# Patient Record
Sex: FEMALE | Race: WHITE | URBAN - METROPOLITAN AREA
[De-identification: names, ages, dates, MRNs, and addresses within clinical notes are randomized per-mention and may not be internally consistent; named-entity substitution may affect disease eponyms.]

---

## 2023-03-07 LAB
C. TRACHOMATIS, EXTERNAL RESULT: NEGATIVE
N. GONORRHOEAE, EXTERNAL RESULT: NEGATIVE

## 2023-03-20 LAB
ABO, EXTERNAL RESULT: NORMAL
HEP B, EXTERNAL RESULT: NEGATIVE
HIV, EXTERNAL RESULT: NEGATIVE
RH FACTOR, EXTERNAL RESULT: POSITIVE
RPR, EXTERNAL RESULT: NON REACTIVE
RUBELLA TITER, EXTERNAL RESULT: NORMAL

## 2023-10-05 LAB — GBS, EXTERNAL RESULT: POSITIVE

## 2023-11-01 ENCOUNTER — HOSPITAL ENCOUNTER (INPATIENT)
Age: 28
LOS: 4 days | Discharge: HOME | End: 2023-11-05
Attending: OBSTETRICS & GYNECOLOGY | Admitting: OBSTETRICS & GYNECOLOGY
Payer: COMMERCIAL

## 2023-11-01 PROCEDURE — 1220000000 HC SEMI PRIVATE OB R&B

## 2023-11-02 ENCOUNTER — ANESTHESIA (OUTPATIENT)
Dept: LABOR AND DELIVERY | Age: 28
End: 2023-11-02
Payer: COMMERCIAL

## 2023-11-02 ENCOUNTER — ANESTHESIA EVENT (OUTPATIENT)
Dept: LABOR AND DELIVERY | Age: 28
End: 2023-11-02
Payer: COMMERCIAL

## 2023-11-02 PROBLEM — Z37.9 NORMAL LABOR: Status: ACTIVE | Noted: 2023-11-02

## 2023-11-02 LAB
ABO + RH BLD: NORMAL
AMPHETAMINES UR QL SCN>1000 NG/ML: NORMAL
BARBITURATES UR QL SCN>200 NG/ML: NORMAL
BASOPHILS # BLD: 0 K/UL (ref 0–0.2)
BASOPHILS NFR BLD: 0.3 %
BENZODIAZ UR QL SCN>200 NG/ML: NORMAL
BLD GP AB SCN SERPL QL: NORMAL
BUPRENORPHINE+NOR UR QL SCN: NORMAL
CANNABINOIDS UR QL SCN>50 NG/ML: NORMAL
COCAINE UR QL SCN: NORMAL
DEPRECATED RDW RBC AUTO: 14.1 % (ref 12.4–15.4)
DRUG SCREEN COMMENT UR-IMP: NORMAL
EOSINOPHIL # BLD: 0 K/UL (ref 0–0.6)
EOSINOPHIL NFR BLD: 0.3 %
FENTANYL SCREEN, URINE: NORMAL
HCT VFR BLD AUTO: 37.5 % (ref 36–48)
HGB BLD-MCNC: 12.7 G/DL (ref 12–16)
LYMPHOCYTES # BLD: 0.9 K/UL (ref 1–5.1)
LYMPHOCYTES NFR BLD: 7.8 %
MCH RBC QN AUTO: 30 PG (ref 26–34)
MCHC RBC AUTO-ENTMCNC: 34 G/DL (ref 31–36)
MCV RBC AUTO: 88.4 FL (ref 80–100)
METHADONE UR QL SCN>300 NG/ML: NORMAL
MONOCYTES # BLD: 0.6 K/UL (ref 0–1.3)
MONOCYTES NFR BLD: 5.1 %
NEUTROPHILS # BLD: 10.3 K/UL (ref 1.7–7.7)
NEUTROPHILS NFR BLD: 86.5 %
OPIATES UR QL SCN>300 NG/ML: NORMAL
OXYCODONE UR QL SCN: NORMAL
PCP UR QL SCN>25 NG/ML: NORMAL
PH UR STRIP: 6 [PH]
PLATELET # BLD AUTO: 215 K/UL (ref 135–450)
PMV BLD AUTO: 8 FL (ref 5–10.5)
RBC # BLD AUTO: 4.24 M/UL (ref 4–5.2)
REAGIN+T PALLIDUM IGG+IGM SERPL-IMP: NORMAL
WBC # BLD AUTO: 11.9 K/UL (ref 4–11)

## 2023-11-02 PROCEDURE — 6360000002 HC RX W HCPCS: Performed by: ADVANCED PRACTICE MIDWIFE

## 2023-11-02 PROCEDURE — 6360000002 HC RX W HCPCS: Performed by: NURSE ANESTHETIST, CERTIFIED REGISTERED

## 2023-11-02 PROCEDURE — 6370000000 HC RX 637 (ALT 250 FOR IP)

## 2023-11-02 PROCEDURE — 86850 RBC ANTIBODY SCREEN: CPT

## 2023-11-02 PROCEDURE — 3700000025 EPIDURAL BLOCK: Performed by: ANESTHESIOLOGY

## 2023-11-02 PROCEDURE — 2500000003 HC RX 250 WO HCPCS: Performed by: NURSE ANESTHETIST, CERTIFIED REGISTERED

## 2023-11-02 PROCEDURE — 86901 BLOOD TYPING SEROLOGIC RH(D): CPT

## 2023-11-02 PROCEDURE — 86780 TREPONEMA PALLIDUM: CPT

## 2023-11-02 PROCEDURE — 85025 COMPLETE CBC W/AUTO DIFF WBC: CPT

## 2023-11-02 PROCEDURE — 2580000003 HC RX 258: Performed by: ADVANCED PRACTICE MIDWIFE

## 2023-11-02 PROCEDURE — 80307 DRUG TEST PRSMV CHEM ANLYZR: CPT

## 2023-11-02 PROCEDURE — 1220000000 HC SEMI PRIVATE OB R&B

## 2023-11-02 PROCEDURE — 2580000003 HC RX 258: Performed by: NURSE ANESTHETIST, CERTIFIED REGISTERED

## 2023-11-02 PROCEDURE — 86900 BLOOD TYPING SEROLOGIC ABO: CPT

## 2023-11-02 RX ORDER — CEFAZOLIN SODIUM IN 0.9 % NACL 2 G/100 ML
2000 PLASTIC BAG, INJECTION (ML) INTRAVENOUS ONCE
Status: COMPLETED | OUTPATIENT
Start: 2023-11-02 | End: 2023-11-02

## 2023-11-02 RX ORDER — SODIUM CHLORIDE 0.9 % (FLUSH) 0.9 %
5-40 SYRINGE (ML) INJECTION PRN
Status: DISCONTINUED | OUTPATIENT
Start: 2023-11-02 | End: 2023-11-03

## 2023-11-02 RX ORDER — ACETAMINOPHEN 500 MG
TABLET ORAL
Status: COMPLETED
Start: 2023-11-02 | End: 2023-11-02

## 2023-11-02 RX ORDER — BUPIVACAINE HYDROCHLORIDE 5 MG/ML
INJECTION, SOLUTION EPIDURAL; INTRACAUDAL PRN
Status: DISCONTINUED | OUTPATIENT
Start: 2023-11-02 | End: 2023-11-02

## 2023-11-02 RX ORDER — MISOPROSTOL 100 UG/1
800 TABLET ORAL PRN
Status: DISCONTINUED | OUTPATIENT
Start: 2023-11-02 | End: 2023-11-03

## 2023-11-02 RX ORDER — DIPHENHYDRAMINE HYDROCHLORIDE 50 MG/ML
25 INJECTION INTRAMUSCULAR; INTRAVENOUS EVERY 4 HOURS PRN
Status: DISCONTINUED | OUTPATIENT
Start: 2023-11-02 | End: 2023-11-03

## 2023-11-02 RX ORDER — NALBUPHINE HYDROCHLORIDE 10 MG/ML
10 INJECTION, SOLUTION INTRAMUSCULAR; INTRAVENOUS; SUBCUTANEOUS
Status: DISCONTINUED | OUTPATIENT
Start: 2023-11-02 | End: 2023-11-03

## 2023-11-02 RX ORDER — BUPIVACAINE HYDROCHLORIDE 2.5 MG/ML
INJECTION, SOLUTION EPIDURAL; INFILTRATION; INTRACAUDAL PRN
Status: DISCONTINUED | OUTPATIENT
Start: 2023-11-02 | End: 2023-11-03 | Stop reason: SDUPTHER

## 2023-11-02 RX ORDER — SODIUM CHLORIDE, SODIUM LACTATE, POTASSIUM CHLORIDE, AND CALCIUM CHLORIDE .6; .31; .03; .02 G/100ML; G/100ML; G/100ML; G/100ML
1000 INJECTION, SOLUTION INTRAVENOUS PRN
Status: DISCONTINUED | OUTPATIENT
Start: 2023-11-02 | End: 2023-11-03

## 2023-11-02 RX ORDER — ACETAMINOPHEN 500 MG
1000 TABLET ORAL ONCE
Status: COMPLETED | OUTPATIENT
Start: 2023-11-03 | End: 2023-11-02

## 2023-11-02 RX ORDER — SODIUM CHLORIDE 0.9 % (FLUSH) 0.9 %
5-40 SYRINGE (ML) INJECTION EVERY 12 HOURS SCHEDULED
Status: DISCONTINUED | OUTPATIENT
Start: 2023-11-02 | End: 2023-11-03

## 2023-11-02 RX ORDER — LIDOCAINE HYDROCHLORIDE 20 MG/ML
INJECTION, SOLUTION EPIDURAL; INFILTRATION; INTRACAUDAL; PERINEURAL PRN
Status: DISCONTINUED | OUTPATIENT
Start: 2023-11-02 | End: 2023-11-03 | Stop reason: SDUPTHER

## 2023-11-02 RX ORDER — DOCUSATE SODIUM 100 MG/1
100 CAPSULE, LIQUID FILLED ORAL 2 TIMES DAILY
Status: DISCONTINUED | OUTPATIENT
Start: 2023-11-02 | End: 2023-11-03

## 2023-11-02 RX ORDER — ACETAMINOPHEN 325 MG/1
650 TABLET ORAL EVERY 4 HOURS PRN
Status: DISCONTINUED | OUTPATIENT
Start: 2023-11-02 | End: 2023-11-03

## 2023-11-02 RX ORDER — SODIUM CHLORIDE, SODIUM LACTATE, POTASSIUM CHLORIDE, CALCIUM CHLORIDE 600; 310; 30; 20 MG/100ML; MG/100ML; MG/100ML; MG/100ML
INJECTION, SOLUTION INTRAVENOUS CONTINUOUS
Status: DISCONTINUED | OUTPATIENT
Start: 2023-11-02 | End: 2023-11-03

## 2023-11-02 RX ORDER — METHYLERGONOVINE MALEATE 0.2 MG/ML
200 INJECTION INTRAVENOUS PRN
Status: DISCONTINUED | OUTPATIENT
Start: 2023-11-02 | End: 2023-11-03

## 2023-11-02 RX ORDER — ONDANSETRON 2 MG/ML
4 INJECTION INTRAMUSCULAR; INTRAVENOUS EVERY 6 HOURS PRN
Status: DISCONTINUED | OUTPATIENT
Start: 2023-11-02 | End: 2023-11-03

## 2023-11-02 RX ORDER — BUPIVACAINE HYDROCHLORIDE 5 MG/ML
INJECTION, SOLUTION EPIDURAL; INTRACAUDAL PRN
Status: DISCONTINUED | OUTPATIENT
Start: 2023-11-02 | End: 2023-11-03 | Stop reason: SDUPTHER

## 2023-11-02 RX ORDER — SODIUM CHLORIDE, SODIUM LACTATE, POTASSIUM CHLORIDE, AND CALCIUM CHLORIDE .6; .31; .03; .02 G/100ML; G/100ML; G/100ML; G/100ML
500 INJECTION, SOLUTION INTRAVENOUS PRN
Status: DISCONTINUED | OUTPATIENT
Start: 2023-11-02 | End: 2023-11-03

## 2023-11-02 RX ORDER — SODIUM CHLORIDE 9 MG/ML
25 INJECTION, SOLUTION INTRAVENOUS PRN
Status: DISCONTINUED | OUTPATIENT
Start: 2023-11-02 | End: 2023-11-03

## 2023-11-02 RX ORDER — CARBOPROST TROMETHAMINE 250 UG/ML
250 INJECTION, SOLUTION INTRAMUSCULAR PRN
Status: DISCONTINUED | OUTPATIENT
Start: 2023-11-02 | End: 2023-11-03

## 2023-11-02 RX ADMIN — BUPIVACAINE HYDROCHLORIDE 3 ML: 5 INJECTION, SOLUTION EPIDURAL; INTRACAUDAL; PERINEURAL at 18:44

## 2023-11-02 RX ADMIN — SODIUM CHLORIDE, POTASSIUM CHLORIDE, SODIUM LACTATE AND CALCIUM CHLORIDE: 600; 310; 30; 20 INJECTION, SOLUTION INTRAVENOUS at 06:26

## 2023-11-02 RX ADMIN — CEFAZOLIN 1000 MG: 1 INJECTION, POWDER, FOR SOLUTION INTRAMUSCULAR; INTRAVENOUS at 19:14

## 2023-11-02 RX ADMIN — Medication 1 MILLI-UNITS/MIN: at 16:30

## 2023-11-02 RX ADMIN — CEFAZOLIN 1000 MG: 1 INJECTION, POWDER, FOR SOLUTION INTRAMUSCULAR; INTRAVENOUS at 10:11

## 2023-11-02 RX ADMIN — Medication 1000 MG: at 23:57

## 2023-11-02 RX ADMIN — DEXMEDETOMIDINE HYDROCHLORIDE 20 MCG: 100 INJECTION, SOLUTION INTRAVENOUS at 23:50

## 2023-11-02 RX ADMIN — SODIUM CHLORIDE, POTASSIUM CHLORIDE, SODIUM LACTATE AND CALCIUM CHLORIDE: 600; 310; 30; 20 INJECTION, SOLUTION INTRAVENOUS at 14:47

## 2023-11-02 RX ADMIN — DEXMEDETOMIDINE HYDROCHLORIDE 20 MCG: 100 INJECTION, SOLUTION INTRAVENOUS at 18:44

## 2023-11-02 RX ADMIN — LIDOCAINE HYDROCHLORIDE 2 ML: 20 INJECTION, SOLUTION EPIDURAL; INFILTRATION; INTRACAUDAL; PERINEURAL at 23:50

## 2023-11-02 RX ADMIN — ACETAMINOPHEN 1000 MG: 500 TABLET ORAL at 23:57

## 2023-11-02 RX ADMIN — Medication 2000 MG: at 02:04

## 2023-11-02 RX ADMIN — BUPIVACAINE HYDROCHLORIDE 3 ML: 2.5 INJECTION, SOLUTION EPIDURAL; INFILTRATION; INTRACAUDAL; PERINEURAL at 23:50

## 2023-11-02 RX ADMIN — BUPIVACAINE HYDROCHLORIDE 5 ML: 2.5 INJECTION, SOLUTION EPIDURAL; INFILTRATION; INTRACAUDAL; PERINEURAL at 01:44

## 2023-11-02 RX ADMIN — LIDOCAINE HYDROCHLORIDE 2 ML: 20 INJECTION, SOLUTION EPIDURAL; INFILTRATION; INTRACAUDAL; PERINEURAL at 18:44

## 2023-11-02 RX ADMIN — BUPIVACAINE HYDROCHLORIDE 3 ML: 5 INJECTION, SOLUTION EPIDURAL; INTRACAUDAL; PERINEURAL at 23:50

## 2023-11-02 RX ADMIN — BUPIVACAINE HYDROCHLORIDE 3 ML: 2.5 INJECTION, SOLUTION EPIDURAL; INFILTRATION; INTRACAUDAL; PERINEURAL at 18:44

## 2023-11-02 RX ADMIN — Medication 15 ML/HR: at 01:49

## 2023-11-02 NOTE — PROGRESS NOTES
S: Comfortable with epidural.  O: VSS, afebrile  FHR- cat 1   IUPC placed at 1340- mVU 95  V/e at IUPC placement 9/100/-1, asynclitic/ LOP  A/P: Ctx pattern not adequate per IUPC- start pitocin augmentation, continue position changes, anticipate .   Cat 1 FHR tracing

## 2023-11-02 NOTE — H&P
Department of Obstetrics and Gynecology   Obstetrics History and Physical        CHIEF COMPLAINT:  contractions    HISTORY OF PRESENT ILLNESS:      The patient is a 29 y.o. female at 38w1d. OB History          1    Para        Term                AB        Living             SAB        IAB        Ectopic        Molar        Multiple        Live Births                Patient presents with a chief complaint as above and is being admitted for active phase labor. Denies lof or vb. States she started jaz Tuesday evening and was unable to sleep, contractions increased in intensity Wednesday evening. Baby active.      Estimated Due Date: Estimated Date of Delivery: 10/30/23    PRENATAL CARE:    Complicated by: elevated 1 hr GCT with nl 3 hr GTT x 4    PAST OB HISTORY:  OB History          1    Para        Term                AB        Living             SAB        IAB        Ectopic        Molar        Multiple        Live Births                    Past Medical History:        Diagnosis Date    Migraines      Past Surgical History:        Procedure Laterality Date    ARM SURGERY Left      Allergies:  Azithromycin and Pcn [penicillins]    Social History:    Social History     Socioeconomic History    Marital status:      Spouse name: Not on file    Number of children: Not on file    Years of education: Not on file    Highest education level: Not on file   Occupational History    Not on file   Tobacco Use    Smoking status: Never    Smokeless tobacco: Never   Vaping Use    Vaping Use: Never used   Substance and Sexual Activity    Alcohol use: Not Currently    Drug use: Never    Sexual activity: Not on file   Other Topics Concern    Not on file   Social History Narrative    Not on file     Social Determinants of Health     Financial Resource Strain: Not on file   Food Insecurity: Not on file   Transportation Needs: Not on file   Physical Activity: Not on file   Stress: Not on file

## 2023-11-02 NOTE — PROGRESS NOTES
S: Comfortable with epidural.  O: VSS, afebrile  FHR- 140, mod variability, decel x 1 during vaginal exam  IUPC- 100-155 mVU  Pit @ 6 mu/min  V/e- 9/100/-1   A: Protracted first stage, pitocin augmentation without adequate MvU, discussed option for LTCS at this point vs continuing to titrate pitocin toward adequate contraction pattern. Reassuring FHR pattern, no s/s chorio. Wen desires to continue pitocin titration, position changes. Dr. Jong Sanchez updated on status.

## 2023-11-02 NOTE — PROGRESS NOTES
S: Comfortable with epidural. Denies c/o.  O: VSS, afebrile  FHR- 125, mod variability, +accels, no decels  [place]- q 2-4 min, palpate mod  V/e- 7/100/-1 after AROM for large amount of thin meconium  A/P: Spontanous labor, AROM, progressing well, continue position changes, anticipate . Cat 1 FHR tracing. Continue to monitor.

## 2023-11-02 NOTE — ANESTHESIA PROCEDURE NOTES
Epidural Block    Patient location during procedure: OB  Start time: 11/2/2023 1:31 AM  End time: 11/2/2023 1:49 AM  Reason for block: labor epidural  Staffing  Performed: resident/CRNA   Resident/CRNA: CAMI Hernandez CRNA  Performed by: CAMI Hernandez CRNA  Authorized by: Michael Deras MD    Epidural  Patient position: sitting  Prep: ChloraPrep  Patient monitoring: continuous pulse ox  Approach: midline  Location: L3-4  Injection technique: LILO saline  Provider prep: mask  Needle  Needle type: Tuohy   Needle gauge: 17 G  Needle length: 3.5 in  Needle insertion depth: 5 cm  Catheter type: side hole  Catheter size: 19 G  Catheter at skin depth: 10 cm  Test dose: negativeCatheter Secured: tegaderm and tape  Assessment  Sensory level: T8  Hemodynamics: stable  Attempts: 1  Outcomes: uncomplicated and patient tolerated procedure well  Additional Notes  Sitting, Sterile prep/drape, 1%Xylo at L3-4, 17ga Tuohy with LILO, 25ga Pencan for w/+CSF for DPE, Pencan removed, Catheter inserted, negative test dose, sterile dressing applied.    Preanesthetic Checklist  Completed: patient identified, IV checked, site marked, risks and benefits discussed, surgical/procedural consents, equipment checked, pre-op evaluation, timeout performed, anesthesia consent given, oxygen available and monitors applied/VS acknowledged

## 2023-11-02 NOTE — ANESTHESIA PRE PROCEDURE
\"CO2\", \"BUN\", \"CREATININE\", \"GFRAA\", \"AGRATIO\", \"LABGLOM\", \"GLUCOSE\", \"GLU\", \"PROT\", \"CALCIUM\", \"BILITOT\", \"ALKPHOS\", \"AST\", \"ALT\"    POC Tests: No results for input(s): \"POCGLU\", \"POCNA\", \"POCK\", \"POCCL\", \"POCBUN\", \"POCHEMO\", \"POCHCT\" in the last 72 hours. Coags: No results found for: \"PROTIME\", \"INR\", \"APTT\"    HCG (If Applicable): No results found for: \"PREGTESTUR\", \"PREGSERUM\", \"HCG\", \"HCGQUANT\"     ABGs: No results found for: \"PHART\", \"PO2ART\", \"KDP7JSC\", \"LQC0KJX\", \"BEART\", \"X6PNOBTM\"     Type & Screen (If Applicable):  No results found for: \"LABABO\", \"LABRH\"    Drug/Infectious Status (If Applicable):  No results found for: \"HIV\", \"HEPCAB\"    COVID-19 Screening (If Applicable): No results found for: \"COVID19\"        Anesthesia Evaluation   no history of anesthetic complications:   Airway: Mallampati: II  TM distance: >3 FB   Neck ROM: full  Mouth opening: > = 3 FB   Dental: normal exam         Pulmonary:Negative Pulmonary ROS and normal exam                               Cardiovascular:Negative CV ROS                      Neuro/Psych:   (+) headaches: migraine headaches,             GI/Hepatic/Renal: Neg GI/Hepatic/Renal ROS            Endo/Other: Negative Endo/Other ROS                    Abdominal:             Vascular: negative vascular ROS. Other Findings:           Anesthesia Plan      epidural     ASA 2             Anesthetic plan and risks discussed with patient and spouse. Plan discussed with attending.                     Ernie Merlin, APRN - [facility]   11/2/2023

## 2023-11-02 NOTE — PROGRESS NOTES
PT presented to [facility] triage unit with complaints of contractions. PT ambulated to T2. Oriented to room and telephone. Pt provided urine sample and was placed on EFM. Pt states she is feeling the baby move. Denies any leaking of fluid or vaginal bleeding. Pt started jaz yesterday. Rates her pain a 8 out of 10 during a contraction. Denies any additional pain/concerns at this time.

## 2023-11-03 PROCEDURE — 7200000001 HC VAGINAL DELIVERY

## 2023-11-03 PROCEDURE — 0KQM0ZZ REPAIR PERINEUM MUSCLE, OPEN APPROACH: ICD-10-PCS | Performed by: OBSTETRICS & GYNECOLOGY

## 2023-11-03 PROCEDURE — 6370000000 HC RX 637 (ALT 250 FOR IP): Performed by: OBSTETRICS & GYNECOLOGY

## 2023-11-03 PROCEDURE — 10907ZC DRAINAGE OF AMNIOTIC FLUID, THERAPEUTIC FROM PRODUCTS OF CONCEPTION, VIA NATURAL OR ARTIFICIAL OPENING: ICD-10-PCS | Performed by: OBSTETRICS & GYNECOLOGY

## 2023-11-03 PROCEDURE — 1220000000 HC SEMI PRIVATE OB R&B

## 2023-11-03 PROCEDURE — 2580000003 HC RX 258: Performed by: OBSTETRICS & GYNECOLOGY

## 2023-11-03 RX ORDER — ONDANSETRON 2 MG/ML
4 INJECTION INTRAMUSCULAR; INTRAVENOUS EVERY 6 HOURS PRN
Status: DISCONTINUED | OUTPATIENT
Start: 2023-11-03 | End: 2023-11-05 | Stop reason: HOSPADM

## 2023-11-03 RX ORDER — SODIUM CHLORIDE 0.9 % (FLUSH) 0.9 %
5-40 SYRINGE (ML) INJECTION PRN
Status: DISCONTINUED | OUTPATIENT
Start: 2023-11-03 | End: 2023-11-05 | Stop reason: HOSPADM

## 2023-11-03 RX ORDER — OXYCODONE HYDROCHLORIDE 5 MG/1
10 TABLET ORAL EVERY 4 HOURS PRN
Status: DISCONTINUED | OUTPATIENT
Start: 2023-11-03 | End: 2023-11-05 | Stop reason: HOSPADM

## 2023-11-03 RX ORDER — FERROUS SULFATE 325(65) MG
325 TABLET ORAL 2 TIMES DAILY WITH MEALS
Status: DISCONTINUED | OUTPATIENT
Start: 2023-11-03 | End: 2023-11-05 | Stop reason: HOSPADM

## 2023-11-03 RX ORDER — SODIUM CHLORIDE, SODIUM LACTATE, POTASSIUM CHLORIDE, CALCIUM CHLORIDE 600; 310; 30; 20 MG/100ML; MG/100ML; MG/100ML; MG/100ML
INJECTION, SOLUTION INTRAVENOUS CONTINUOUS
Status: DISCONTINUED | OUTPATIENT
Start: 2023-11-03 | End: 2023-11-05 | Stop reason: HOSPADM

## 2023-11-03 RX ORDER — IBUPROFEN 800 MG/1
800 TABLET ORAL EVERY 8 HOURS PRN
Status: DISCONTINUED | OUTPATIENT
Start: 2023-11-03 | End: 2023-11-05 | Stop reason: HOSPADM

## 2023-11-03 RX ORDER — SODIUM CHLORIDE 9 MG/ML
INJECTION, SOLUTION INTRAVENOUS PRN
Status: DISCONTINUED | OUTPATIENT
Start: 2023-11-03 | End: 2023-11-05 | Stop reason: HOSPADM

## 2023-11-03 RX ORDER — LANOLIN 100 %
OINTMENT (GRAM) TOPICAL PRN
Status: DISCONTINUED | OUTPATIENT
Start: 2023-11-03 | End: 2023-11-05 | Stop reason: HOSPADM

## 2023-11-03 RX ORDER — ACETAMINOPHEN 500 MG
1000 TABLET ORAL EVERY 8 HOURS PRN
Status: DISCONTINUED | OUTPATIENT
Start: 2023-11-03 | End: 2023-11-05 | Stop reason: HOSPADM

## 2023-11-03 RX ORDER — DOCUSATE SODIUM 100 MG/1
100 CAPSULE, LIQUID FILLED ORAL 2 TIMES DAILY PRN
Status: DISCONTINUED | OUTPATIENT
Start: 2023-11-03 | End: 2023-11-05 | Stop reason: HOSPADM

## 2023-11-03 RX ORDER — SIMETHICONE 80 MG
80 TABLET,CHEWABLE ORAL EVERY 6 HOURS PRN
Status: DISCONTINUED | OUTPATIENT
Start: 2023-11-03 | End: 2023-11-05 | Stop reason: HOSPADM

## 2023-11-03 RX ORDER — SODIUM CHLORIDE 0.9 % (FLUSH) 0.9 %
5-40 SYRINGE (ML) INJECTION EVERY 12 HOURS SCHEDULED
Status: DISCONTINUED | OUTPATIENT
Start: 2023-11-03 | End: 2023-11-05 | Stop reason: HOSPADM

## 2023-11-03 RX ORDER — OXYCODONE HYDROCHLORIDE 5 MG/1
5 TABLET ORAL EVERY 4 HOURS PRN
Status: DISCONTINUED | OUTPATIENT
Start: 2023-11-03 | End: 2023-11-05 | Stop reason: HOSPADM

## 2023-11-03 RX ADMIN — SODIUM CHLORIDE, PRESERVATIVE FREE 10 ML: 5 INJECTION INTRAVENOUS at 16:14

## 2023-11-03 RX ADMIN — IBUPROFEN 800 MG: 800 TABLET, FILM COATED ORAL at 11:58

## 2023-11-03 RX ADMIN — IBUPROFEN 800 MG: 800 TABLET, FILM COATED ORAL at 20:30

## 2023-11-03 RX ADMIN — ACETAMINOPHEN 1000 MG: 500 TABLET ORAL at 08:00

## 2023-11-03 RX ADMIN — ACETAMINOPHEN 1000 MG: 500 TABLET ORAL at 16:13

## 2023-11-03 RX ADMIN — IBUPROFEN 800 MG: 800 TABLET, FILM COATED ORAL at 03:44

## 2023-11-03 RX ADMIN — DOCUSATE SODIUM 100 MG: 100 CAPSULE, LIQUID FILLED ORAL at 20:30

## 2023-11-03 RX ADMIN — DOCUSATE SODIUM 100 MG: 100 CAPSULE, LIQUID FILLED ORAL at 08:00

## 2023-11-03 RX ADMIN — SODIUM CHLORIDE, PRESERVATIVE FREE 10 ML: 5 INJECTION INTRAVENOUS at 20:30

## 2023-11-03 ASSESSMENT — PAIN DESCRIPTION - DESCRIPTORS
DESCRIPTORS: SORE
DESCRIPTORS: PRESSURE
DESCRIPTORS: ACHING

## 2023-11-03 ASSESSMENT — PAIN DESCRIPTION - LOCATION
LOCATION: PERINEUM

## 2023-11-03 ASSESSMENT — PAIN - FUNCTIONAL ASSESSMENT
PAIN_FUNCTIONAL_ASSESSMENT: ACTIVITIES ARE NOT PREVENTED

## 2023-11-03 ASSESSMENT — PAIN SCALES - GENERAL
PAINLEVEL_OUTOF10: 0
PAINLEVEL_OUTOF10: 4

## 2023-11-03 NOTE — PROGRESS NOTES
S: Pushing well with contractions. O: VSS, afebrile  FHR- reassuring  IUPC- q 2 min  Pit @ 10 mu/min  V/e- stretchy anterior lip reduced with one set of pushes  0 station  A/P: Second stage, continue pushing, anticipate . Dr. Eladia Castro updated on progress/pushing  Reassuring FHR tracing.

## 2023-11-03 NOTE — FLOWSHEET NOTE
Dr. Prema Prajapati at bedside for evaluation for vacuum assisted delivery. Decision made to use vacuum. Pt actively pushing. RN remains in continuous attendance at the bedside. Assessment and evaluation of fetal heart rate ongoing via continuous EFM. Viable baby boy born at 46.

## 2023-11-03 NOTE — L&D DELIVERY SUMMARY NOTE
[address] [place], [place] [address]                            LABOR AND DELIVERY NOTE    PATIENT NAME: Lonnell Boeck                    :        1995  MED REC NO:   [id number]                          ROOM:       2740  ACCOUNT NO:   [de-identified]                           ADMIT DATE: 2023  PROVIDER:     Antoinette Merida MD    DATE OF PROCEDURE:  2023    DELIVERY SUMMARY    The patient is a 27-year-old G1, P0, who presented at 40 weeks and 3  days gestation in active phase labor. She was noted to be GBS positive  and had PENICILLIN and AZITHROMYCIN allergy and was started on Ancef for  GBS prophylaxis. She was artificially ruptured for clear fluid and did  receive Pitocin augmentation. She had protracted first stage and a very  slow progression from 7 cm to complete. When she was complete at 0  station, she began pushing, pushed for approximately 1 hour 45 minutes  before resting, receiving a redose and then pushed another 45 minutes  and at this point, had brought baby down to complete with a small crown. Baby noted to be in SHERI position and did try to turn the baby with the  next contraction with a little bit of rotation. At this time, the  patient was counseled on a vacuum-assisted vaginal delivery, reviewed  the vacuum as well as risks including scalp lacerations, bruising,  cephalohematomas, subgaleal hematoma, intracranial hemorrhage. The  patient and partner stated understanding and desired to proceed. The  bladder was emptied. Anesthesia noted to be adequate. Baby was noted  to be in SHERI position and cephalic. The Mityvac vacuum was then applied  onto the fetal head at the flexion point and then as the next  contraction build, pressure was applied. With the first contraction, we  did have two pop-offs that were more thought to be associated with thick  meconium.   The vacuum was put on at 1:46 a.m. and we

## 2023-11-03 NOTE — ANESTHESIA POSTPROCEDURE EVALUATION
Department of Anesthesiology  Postprocedure Note    Patient: Morena Moreno  MRN: [id number]  YOB: 1995  Date of evaluation: 11/3/2023      Procedure Summary     Date: 11/02/23 Room / Location:     Anesthesia Start: 0131 Anesthesia Stop: 11/03/23 0151    Procedure: Labor Analgesia Diagnosis:     Scheduled Providers:  Responsible Provider: Joanne Herrera MD    Anesthesia Type: epidural ASA Status: 2          Anesthesia Type: No value filed.     Eduardo Phase I: Eduardo Score: 9    Eduardo Phase II: Eduardo Score: 10      Anesthesia Post Evaluation    Patient location during evaluation: PACU  Level of consciousness: awake  Airway patency: patent  Nausea & Vomiting: no vomiting  Complications: no  Cardiovascular status: blood pressure returned to baseline  Respiratory status: acceptable  Hydration status: stable  Pain management: adequate

## 2023-11-03 NOTE — LACTATION NOTE
This note was copied from a baby's chart. Lactation Progress Note      Data:    Lactation consult for primip breast feeder who delivered last night. Baby has not had a good feed yet and has had increased respirations. Mob currently has baby skin to skin and has expressed colostrum for baby. Blood sugars have been 98 and 89. Action: LC encouraged continued skin to skin and to express colostrum until baby shows interest in suckling. If baby remains disinterested pumping ma be considered. Encouraged hand expression for now. LC stressed the importance of a good deep comfortable latch when baby does begin feeding. Encouraged to call [facility] for assistance with position and latch. [facility] number on board for f/u. Response: Verbalized understanding and will call for assistance with feeding.

## 2023-11-03 NOTE — L&D DELIVERY NOTE
Department of Obstetrics and Gynecology  Spontaneous Vaginal Delivery Note    Labor & Delivery Summary  Dilation Complete Date: 23  Dilation Complete Time:     Pre-operative Diagnosis:  29yo G1 at 40wk3d active labor     Post-operative Diagnosis:  Living  infant(s) and Male    Procedure:  outlet VAVD    Surgeon:   Dr. Trinidad Porter for the patient's :  Mechele Belt [[id number]]   APGAR One: N/A    Information for the patient's :  Mechele Belt [[id number]]   APGAR Five: N/A   5/8  Information for the patient's :  Mechele Belt [[id number]]   Birth Weight: N/A     Anesthesia:  epidural anesthesia    Estimated blood loss:  250ml    Specimen:  Placenta not sent to pathology     Cord blood sent Yes- pending arterial pH 7.031 base exc -11.3, venous   Venous pH 7.241, base exc -9.3    Complications:  none    Condition:  infant stable to general nursery and mother stable    Details of Procedure:   See dictation#: [id number]    Marcelo Goldberg MD

## 2023-11-04 LAB
DEPRECATED RDW RBC AUTO: 14 % (ref 12.4–15.4)
HCT VFR BLD AUTO: 26.9 % (ref 36–48)
HGB BLD-MCNC: 9.2 G/DL (ref 12–16)
MCH RBC QN AUTO: 30.6 PG (ref 26–34)
MCHC RBC AUTO-ENTMCNC: 34 G/DL (ref 31–36)
MCV RBC AUTO: 89.8 FL (ref 80–100)
PLATELET # BLD AUTO: 164 K/UL (ref 135–450)
PMV BLD AUTO: 7.2 FL (ref 5–10.5)
RBC # BLD AUTO: 3 M/UL (ref 4–5.2)
WBC # BLD AUTO: 8.6 K/UL (ref 4–11)

## 2023-11-04 PROCEDURE — 6370000000 HC RX 637 (ALT 250 FOR IP): Performed by: OBSTETRICS & GYNECOLOGY

## 2023-11-04 PROCEDURE — 85027 COMPLETE CBC AUTOMATED: CPT

## 2023-11-04 PROCEDURE — 1220000000 HC SEMI PRIVATE OB R&B

## 2023-11-04 PROCEDURE — 6370000000 HC RX 637 (ALT 250 FOR IP)

## 2023-11-04 PROCEDURE — 36415 COLL VENOUS BLD VENIPUNCTURE: CPT

## 2023-11-04 PROCEDURE — 2580000003 HC RX 258: Performed by: OBSTETRICS & GYNECOLOGY

## 2023-11-04 RX ORDER — DIAPER,BRIEF,INFANT-TODD,DISP
EACH MISCELLANEOUS 2 TIMES DAILY
Status: DISCONTINUED | OUTPATIENT
Start: 2023-11-04 | End: 2023-11-05 | Stop reason: HOSPADM

## 2023-11-04 RX ADMIN — ACETAMINOPHEN 1000 MG: 500 TABLET ORAL at 00:40

## 2023-11-04 RX ADMIN — IBUPROFEN 800 MG: 800 TABLET, FILM COATED ORAL at 04:30

## 2023-11-04 RX ADMIN — ACETAMINOPHEN 1000 MG: 500 TABLET ORAL at 08:31

## 2023-11-04 RX ADMIN — FERROUS SULFATE TAB 325 MG (65 MG ELEMENTAL FE) 325 MG: 325 (65 FE) TAB at 20:43

## 2023-11-04 RX ADMIN — FERROUS SULFATE TAB 325 MG (65 MG ELEMENTAL FE) 325 MG: 325 (65 FE) TAB at 08:30

## 2023-11-04 RX ADMIN — HYDROCORTISONE: 1 CREAM TOPICAL at 10:53

## 2023-11-04 RX ADMIN — WITCH HAZEL 1 EACH: 500 SOLUTION RECTAL; TOPICAL at 04:30

## 2023-11-04 RX ADMIN — IBUPROFEN 800 MG: 800 TABLET, FILM COATED ORAL at 12:42

## 2023-11-04 RX ADMIN — ACETAMINOPHEN 1000 MG: 500 TABLET ORAL at 16:58

## 2023-11-04 RX ADMIN — SODIUM CHLORIDE, PRESERVATIVE FREE 10 ML: 5 INJECTION INTRAVENOUS at 08:30

## 2023-11-04 RX ADMIN — IBUPROFEN 800 MG: 800 TABLET, FILM COATED ORAL at 20:44

## 2023-11-04 RX ADMIN — DOCUSATE SODIUM 100 MG: 100 CAPSULE, LIQUID FILLED ORAL at 08:30

## 2023-11-04 RX ADMIN — DOCUSATE SODIUM 100 MG: 100 CAPSULE, LIQUID FILLED ORAL at 20:44

## 2023-11-04 RX ADMIN — SODIUM CHLORIDE, PRESERVATIVE FREE 10 ML: 5 INJECTION INTRAVENOUS at 20:44

## 2023-11-04 ASSESSMENT — PAIN - FUNCTIONAL ASSESSMENT
PAIN_FUNCTIONAL_ASSESSMENT: ACTIVITIES ARE NOT PREVENTED

## 2023-11-04 ASSESSMENT — PAIN DESCRIPTION - LOCATION
LOCATION: PERINEUM
LOCATION: PERINEUM
LOCATION: ABDOMEN;PERINEUM
LOCATION: PERINEUM
LOCATION: PERINEUM

## 2023-11-04 ASSESSMENT — PAIN DESCRIPTION - DESCRIPTORS
DESCRIPTORS: CRAMPING;PRESSURE
DESCRIPTORS: SORE;PRESSURE
DESCRIPTORS: SORE
DESCRIPTORS: SORE
DESCRIPTORS: ACHING

## 2023-11-04 ASSESSMENT — PAIN SCALES - GENERAL
PAINLEVEL_OUTOF10: 3

## 2023-11-04 NOTE — PROGRESS NOTES
Report received from ALBERT Prasad [facility], [place]. Bedside report given. Introduced myself to pt as her RN for the night. I put my name and phone number on the white board and verified pt knows how to use her room phone to get a hold of me. Pt was given her plan of care for the night. Call light within reach. Bed in lowest position and wheels are locked. Pt verbalized understanding and denies any further needs at this time.

## 2023-11-04 NOTE — FLOWSHEET NOTE
Infant was transferred to [facility] for observation due to respirations. Pt and her  were ambulated to [facility] at 1800 with this RN and instructed on how to call into nursery where to wash hands and taken to infant's bedside.

## 2023-11-04 NOTE — LACTATION NOTE
This note was copied from a baby's chart. Lactation Progress Note      Data:   FOB requests f/u support with latching. Infant STS with mother, offering the breast in cradle position, infant rooting vigorously at the breast without latching. Action: Shown tips to improve position so that infant is more supported at the breast and shown cross cradle hold. Encouraged breast support behind the areola and encouraged to hand express drops of colostrum. Infant fussy, allowed to suck on gloved finger while mom expressing colostrum. Infant attempting to latch with vigorous rooting, MOB states baby has not had a pacifier or bottle but was suctioned after birth. Explained how this may impact latching. Shown how to support the breast with sandwich technique, and encouraged to stroke roof of infant's mouth when latching to encourage easier latching. SALINAS achieved after several attempts, infant nursing well with SRS and AS. Breast feeding education reinforced. Good 10 minute feeding observed, baby then detached from the breast and mother placed baby STS on her chest for burping. Infant tolerated well, and sleepy after feeding. Name and number on whiteboard. Encouraged to call for f/u support prn. Response: Verbalized understanding of teaching provided. Will call for f/u support prn.

## 2023-11-04 NOTE — LACTATION NOTE
This note was copied from a baby's chart. Lactation Progress Note      Data:     RN requests f/u with primip breast feeder, 1 pp. Baby has been disinterested at the breast, so mom began pumping overnight. On consult, mom and RN state baby just finished a good feeding at the breast.     Action: Introduced self as LC on for the day and offered support. Education provided on signs of effective feeding and swallowing at the breast with feedings to monitor for. Encouraged to continue to offer the breast ad awa with hunger cues, and every 2-3 hours if baby is sleepy and without feeding cues. Reassured that sleepy behavior is common on the first DOL as baby recovers from birth, explained that feedings should improve today and this evening, educating that baby should have a minimum of 8-12 good feedings daily in a 24 hour period after the first DOL. Encouraged mother to pump as needed for poor feedings, but may discontinue pumping if baby is feeding well directly from the breast, meeting feeding and output goals, and weight loss WNL. Reviewed importance of SALINAS to the breast, explaining how to position baby to the breast well, how to offer breast support, and how to obtain SALINAS. Educated on how a good latch should look and feel vs a shallow latch and how to break suction of the latch as needed if the latch is shallow or causing discomfort. Breastfeeding education reviewed including breast care, how milk production works and types of milk mother is and will produce, educated on signs of hunger and satiety, what to expect with  feeding behaviors, and how to know that baby is getting enough at the breast including daily goals for infant feedings, output, and weight trends. Name and number provided on whiteboard. Encouraged to call for [address] to assess latch and for f/u support prn. Response: Verbalized understanding of teaching provided. Will call for f/u support prn.

## 2023-11-05 VITALS
RESPIRATION RATE: 18 BRPM | OXYGEN SATURATION: 98 % | DIASTOLIC BLOOD PRESSURE: 76 MMHG | WEIGHT: 182 LBS | SYSTOLIC BLOOD PRESSURE: 128 MMHG | TEMPERATURE: 98.2 F | HEART RATE: 84 BPM | HEIGHT: 63 IN | BODY MASS INDEX: 32.25 KG/M2

## 2023-11-05 PROBLEM — Z37.9 NORMAL LABOR: Status: RESOLVED | Noted: 2023-11-02 | Resolved: 2023-11-05

## 2023-11-05 PROCEDURE — 6370000000 HC RX 637 (ALT 250 FOR IP): Performed by: OBSTETRICS & GYNECOLOGY

## 2023-11-05 RX ORDER — PSEUDOEPHEDRINE HCL 30 MG
100 TABLET ORAL 2 TIMES DAILY PRN
Refills: 0 | COMMUNITY
Start: 2023-11-05

## 2023-11-05 RX ORDER — FERROUS SULFATE 325(65) MG
325 TABLET ORAL 2 TIMES DAILY WITH MEALS
Qty: 30 TABLET | Refills: 0 | COMMUNITY
Start: 2023-11-05

## 2023-11-05 RX ORDER — ACETAMINOPHEN 500 MG
1000 TABLET ORAL EVERY 8 HOURS PRN
Qty: 120 TABLET | Refills: 0 | COMMUNITY
Start: 2023-11-05

## 2023-11-05 RX ORDER — IBUPROFEN 800 MG/1
800 TABLET ORAL EVERY 8 HOURS PRN
Qty: 90 TABLET | Refills: 0 | Status: SHIPPED | OUTPATIENT
Start: 2023-11-05 | End: 2023-12-05

## 2023-11-05 RX ADMIN — DOCUSATE SODIUM 100 MG: 100 CAPSULE, LIQUID FILLED ORAL at 09:48

## 2023-11-05 RX ADMIN — IBUPROFEN 800 MG: 800 TABLET, FILM COATED ORAL at 03:35

## 2023-11-05 RX ADMIN — WITCH HAZEL: 500 SOLUTION RECTAL; TOPICAL at 01:41

## 2023-11-05 RX ADMIN — OXYCODONE 5 MG: 5 TABLET ORAL at 12:46

## 2023-11-05 RX ADMIN — FERROUS SULFATE TAB 325 MG (65 MG ELEMENTAL FE) 325 MG: 325 (65 FE) TAB at 09:49

## 2023-11-05 RX ADMIN — IBUPROFEN 800 MG: 800 TABLET, FILM COATED ORAL at 12:46

## 2023-11-05 RX ADMIN — ACETAMINOPHEN 1000 MG: 500 TABLET ORAL at 09:49

## 2023-11-05 RX ADMIN — ACETAMINOPHEN 1000 MG: 500 TABLET ORAL at 01:31

## 2023-11-05 ASSESSMENT — PAIN SCALES - GENERAL
PAINLEVEL_OUTOF10: 3
PAINLEVEL_OUTOF10: 3

## 2023-11-05 ASSESSMENT — PAIN DESCRIPTION - LOCATION: LOCATION: PERINEUM

## 2023-11-05 NOTE — FLOWSHEET NOTE
Discharge Phone Call    Patient Name: Blossom Shell     [facility] Care Provider: Reese Osman MD Discharge Date: 2023    Disposition of baby:    Phone Number: [phone] (home)     Attempts to Contact:  Date:    Caller  Date:    Caller  Date:    Caller    Information for the patient's :  Mara Lazo [[id number]]   Delivery Method: Vaginal, Vacuum (Extractor)     1. Now that you are at home is your pain being well controlled? Y/N   If no, instruct to call       provider. 2. Are you breastfeeding? Y/N    Do you need any extra support from our lactation staff? Y/N    If yes, provide number for lactation. 3. Have you made or already had your first appointment with the baby's doctor? Y/N   If no, do      you know when to schedule it? Y/N    4. Have you scheduled your follow-up appointment? Y/N  If no, do you know when to schedule       it? Y/N   If no, they can find it on printed discharge instructions. 5. Did staff discuss safe sleep during your stay? Y/N   6. Did we explain things in a way you could understand? Y/N  7. Were we respectful of your preferences for labor and birth and include you in the plan of       care? Y/N  If no, please explain _______________________________________________  8. Is there anyone in particular you would like to mention who provided care for you? _______      _________________________________________________________________________     9. Were you given a Post-Birth Warning Signs handout? Y/N  Do you have it somewhere      easily accessible? Y/N  If no, please send them a copy and ask them to put it somewhere      easily found. 10. Have you been crying excessively, having anger or mood swings that feel out of control, or       feel like you can't cope with caring for yourself or baby? Y/N   If yes, they may be showing       signs of postpartum depression and should call provider.  There is also a        depression test on page C5

## 2023-11-05 NOTE — DISCHARGE SUMMARY
Obstetrical Discharge Form    Gestational Age: 44w2d    Antepartum complications: abnormal 1hr GCT w/ normal 3hr x4    Date of Delivery: 23      Type of Delivery: vaginal, spontaneous    Delivered By: Ayala Pickett     Baby:      Information for the patient's :  Kristine Mondragon [[id number]]   APGAR One: 5   Information for the patient's :  Kristine Mondragon [[id number]]   APGAR Five: 8   Information for the patient's :  Kristine Mondragon [[id number]]   Birth Weight: 4.185 kg (9 lb 3.6 oz)     Anesthesia: Epidural    Intrapartum complications: None    Postpartum complications: anemia    Discharge Medication:      Medication List        START taking these medications      acetaminophen 500 MG tablet  Commonly known as: TYLENOL  Take 2 tablets by mouth every 8 hours as needed for Pain     docusate 100 MG Caps  Commonly known as: COLACE, DULCOLAX  Take 100 mg by mouth 2 times daily as needed for Constipation     ferrous sulfate 325 (65 Fe) MG tablet  Commonly known as: IRON 325  Take 1 tablet by mouth 2 times daily (with meals)     ibuprofen 800 MG tablet  Commonly known as: ADVIL;MOTRIN  Take 1 tablet by mouth every 8 hours as needed for Pain            CONTINUE taking these medications      PRENATAL 1+1 PO               Where to Get Your Medications        These medications were sent to [address] [place], [place] [address] [address] Rosaura Loyal [address] [address]      Phone: [phone]   ibuprofen 800 MG tablet       You can get these medications from any pharmacy    You don't need a prescription for these medications  acetaminophen 500 MG tablet  docusate 100 MG Caps  ferrous sulfate 325 (65 Fe) MG tablet         Discharge Condition:  good    Discharge Date: 23    PLAN:  Follow up in 6 weeks for routine PP visit  All questions answered  D/C summary begun at delivery for D/C planning purposes, any delay

## 2023-11-05 NOTE — PLAN OF CARE
Problem: Pain  Goal: Verbalizes/displays adequate comfort level or baseline comfort level  2023 by Kayla Barbour RN  Outcome: Progressing  11/3/2023 2017 by Sheba Sarmiento RN  Outcome: Progressing     Problem: Postpartum  Goal: Experiences normal postpartum course  Description:  Postpartum OB-Pregnancy care plan goal which identifies if the mother is experiencing a normal postpartum course  2023 by Kayla Barbour RN  Outcome: Progressing  11/3/2023 2017 by Sheba Sarmiento RN  Outcome: Progressing  Goal: Appropriate maternal -  bonding  Description:  Postpartum OB-Pregnancy care plan goal which identifies if the mother and  are bonding appropriately  2023 by Kayla Barbour RN  Outcome: Progressing  11/3/2023 2017 by Sheba Sarmiento RN  Outcome: Progressing  Goal: Establishment of infant feeding pattern  Description:  Postpartum OB-Pregnancy care plan goal which identifies if the mother is establishing a feeding pattern with their   2023 by Kayla Barbour RN  Outcome: Progressing  11/3/2023 2017 by Sheba Sarmiento RN  Outcome: Progressing  Goal: Incisions, wounds, or drain sites healing without S/S of infection  2023 by Kayla Barbour RN  Outcome: Progressing  11/3/2023 2017 by Sheba Sarmiento RN  Outcome: Progressing     Problem: Infection - Adult  Goal: Absence of infection at discharge  Outcome: Progressing  Goal: Absence of infection during hospitalization  2023 by Kayla Barbour RN  Outcome: Progressing  11/3/2023 2017 by Sheba Sarmiento RN  Outcome: Progressing  Goal: Absence of fever/infection during anticipated neutropenic period  2023 by Kayla Barbour RN  Outcome: Progressing  11/3/2023 2017 by Sheba Sarmiento RN  Outcome: Progressing     Problem: Safety - Adult  Goal: Free from fall injury  2023 by Kayla Barbour RN  Outcome: Progressing  11/3/2023 2017 by Sheba Sarmiento
Problem: Pain  Goal: Verbalizes/displays adequate comfort level or baseline comfort level  2023 by Lala Cedillo RN  Outcome: Progressing  Flowsheets (Taken 2023)  Verbalizes/displays adequate comfort level or baseline comfort level:   Encourage patient to monitor pain and request assistance   Assess pain using appropriate pain scale   Administer analgesics based on type and severity of pain and evaluate response   Implement non-pharmacological measures as appropriate and evaluate response  Pt continues to rate pain 3/10 and states PRN meds are treating. Problem: Postpartum  Goal: Experiences normal postpartum course  Description:  Postpartum OB-Pregnancy care plan goal which identifies if the mother is experiencing a normal postpartum course  2023 by Lala Cedillo RN  Outcome: Progressing     Problem: Postpartum  Goal: Appropriate maternal -  bonding  Description:  Postpartum OB-Pregnancy care plan goal which identifies if the mother and  are bonding appropriately  2023 by Lala Cedillo RN  Outcome: Progressing  Pt going to see infant in [facility] for feedings.      Problem: Postpartum  Goal: Establishment of infant feeding pattern  Description:  Postpartum OB-Pregnancy care plan goal which identifies if the mother is establishing a feeding pattern with their   2023 by Lala Cedillo RN  Outcome: Progressing     Problem: Postpartum  Goal: Incisions, wounds, or drain sites healing without S/S of infection  2023 by Lala Cedillo RN  Outcome: Progressing     Problem: Infection - Adult  Goal: Absence of infection at discharge  2023 by Lala Cedillo RN  Outcome: Progressing  Flowsheets (Taken 2023)  Absence of infection at discharge:   Assess and monitor for signs and symptoms of infection   Monitor all insertion sites i.e., indwelling lines, tubes and drains   Instruct and encourage
Problem: Pain  Goal: Verbalizes/displays adequate comfort level or baseline comfort level  Outcome: Progressing     Problem: Vaginal Birth or  Section  Goal: Fetal and maternal status remain reassuring during the birth process  Outcome: Progressing     Problem: Postpartum  Goal: Experiences normal postpartum course  Outcome: Progressing  Goal: Appropriate maternal -  bonding  Outcome: Progressing  Goal: Establishment of infant feeding pattern  Outcome: Progressing  Goal: Incisions, wounds, or drain sites healing without S/S of infection  Outcome: Progressing     Problem: Infection - Adult  Goal: Absence of infection at discharge  Outcome: Progressing  Goal: Absence of infection during hospitalization  Outcome: Progressing  Goal: Absence of fever/infection during anticipated neutropenic period  Outcome: Progressing     Problem: Safety - Adult  Goal: Free from fall injury  Outcome: Progressing     Problem: Discharge Planning  Goal: Discharge to home or other facility with appropriate resources  Outcome: Progressing     Problem: Chronic Conditions and Co-morbidities  Goal: Patient's chronic conditions and co-morbidity symptoms are monitored and maintained or improved  Outcome: Progressing
Problem: Postpartum  Goal: Experiences normal postpartum course  Description:  Postpartum OB-Pregnancy care plan goal which identifies if the mother is experiencing a normal postpartum course  11/3/2023 1559 by Selam Raya RN  Outcome: Progressing  11/3/2023 0847 by Damian Gutierres RN  Outcome: Progressing  Goal: Appropriate maternal -  bonding  Description:  Postpartum OB-Pregnancy care plan goal which identifies if the mother and  are bonding appropriately  11/3/2023 1559 by Selam Raya RN  Outcome: Progressing  11/3/2023 0847 by Damian Gutierres RN  Outcome: Progressing  Goal: Establishment of infant feeding pattern  Description:  Postpartum OB-Pregnancy care plan goal which identifies if the mother is establishing a feeding pattern with their   11/3/2023 1559 by Selam Raya RN  Outcome: Progressing  11/3/2023 0847 by Damian Gutierres RN  Outcome: Progressing  Goal: Incisions, wounds, or drain sites healing without S/S of infection  11/3/2023 1559 by Selam Raya RN  Outcome: Progressing  11/3/2023 0847 by Damian Gutierres RN  Outcome: Progressing  Flowsheets (Taken 11/3/2023 0813)  Incisions, Wounds, or Drain Sites Healing Without Sign and Symptoms of Infection:   ADMISSION and DAILY: Assess and document risk factors for pressure ulcer development   TWICE DAILY: Assess and document skin integrity     Problem: Infection - Adult  Goal: Absence of infection at discharge  11/3/2023 1559 by Selam Raya RN  Outcome: Progressing  11/3/2023 0847 by Damian Gutierres RN  Outcome: Progressing  Goal: Absence of infection during hospitalization  11/3/2023 1559 by Selam Raya RN  Outcome: Progressing  11/3/2023 0847 by Damian Gutierres RN  Outcome: Progressing  Goal: Absence of fever/infection during anticipated neutropenic period  11/3/2023 1559 by Selam Raya RN  Outcome: Progressing  11/3/2023 0847 by Damian Gutierres
Progressing     Problem: Discharge Planning  Goal: Discharge to home or other facility with appropriate resources  Outcome: Progressing     Problem: Chronic Conditions and Co-morbidities  Goal: Patient's chronic conditions and co-morbidity symptoms are monitored and maintained or improved  Outcome: Progressing
hospitalization  11/2/2023 0800 by Stuart Aparicio RN  Outcome: Progressing  11/2/2023 0041 by Mike Dodson RN  Outcome: Progressing  Goal: Absence of fever/infection during anticipated neutropenic period  11/2/2023 0800 by Stuart Aparicio RN  Outcome: Progressing  11/2/2023 0041 by Mike Dodson RN  Outcome: Progressing     Problem: Safety - Adult  Goal: Free from fall injury  11/2/2023 0800 by Stuart Aparicio RN  Outcome: Progressing  11/2/2023 0041 by Mike Dodson RN  Outcome: Progressing     Problem: Discharge Planning  Goal: Discharge to home or other facility with appropriate resources  11/2/2023 0800 by Stuart Aparicio RN  Outcome: Progressing  11/2/2023 0041 by Mike Dodson RN  Outcome: Progressing     Problem: Chronic Conditions and Co-morbidities  Goal: Patient's chronic conditions and co-morbidity symptoms are monitored and maintained or improved  11/2/2023 0800 by Stuart Aparicio RN  Outcome: Progressing  11/2/2023 0041 by Mike Dodson RN  Outcome: Progressing

## 2023-11-05 NOTE — DISCHARGE INSTRUCTIONS
Thank you for the opportunity to care for you and your family. We hope that you are happy with the care we provided during your stay in the [facility]. We want to ensure that you have the help you need when you leave the hospital. If there is anything we can assist you with, please let us know. Breastfeeding mothers may contact our lactation specialists with any problems or questions. [facility] services phone number is [phone]. Leave a message and your call will be returned. Please refer to the information provided in the postpartum care booklet. The following are warning signs to remember. Call 911 if you have:    Chest pain or pressure  Shortness of breath, even at rest  Thoughts of harming yourself or others  Seizures    Call your healthcare provider if you have:    Temperature of 100.4 degrees or higher  Stitches that are not healing        -- Swelling, bleeding, drainage, foul odor, redness or warmth in/around your           stitches, staples, or incision (scar)        -- Bad smelling blood or discharge from the vagina  Vaginal bleeding that has increased         -- Soaking through one pad in an hour        -- You are passing clots larger than the size of a lemon  Red, warm tender area(s) in your breast or calf  Headache that does not get better, even after taking medicine; or headache with vision changes    Remember to notify all healthcare providers from your date of delivery to up to one year after giving birth! CARING FOR YOURSELF        DIET/ACTIVITY    Eat a well balanced diet focusing on foods high in fiber and protein. Drink plenty of fluids, especially water. To avoid constipation you may take a mild stool softener as recommended by your doctor or midwife. Gradually increase your activity. Resume an exercise regime only after being advised by your doctor or midwife. When sitting or lying down, keep your legs elevated to reduce swelling.   Avoid
